# Patient Record
Sex: MALE | Race: WHITE | NOT HISPANIC OR LATINO | Employment: FULL TIME | URBAN - METROPOLITAN AREA
[De-identification: names, ages, dates, MRNs, and addresses within clinical notes are randomized per-mention and may not be internally consistent; named-entity substitution may affect disease eponyms.]

---

## 2023-11-30 ENCOUNTER — HOSPITAL ENCOUNTER (OUTPATIENT)
Dept: CARDIOLOGY | Facility: CLINIC | Age: 78
Discharge: HOME | End: 2023-11-30
Payer: MEDICARE

## 2023-11-30 DIAGNOSIS — I34.0 MITRAL VALVE INSUFFICIENCY, UNSPECIFIED ETIOLOGY: ICD-10-CM

## 2023-11-30 LAB
AORTIC VALVE PEAK VELOCITY: 1.53
AV PEAK GRADIENT: 9.4
AVA (PEAK VEL): 3.61
EJECTION FRACTION APICAL 4 CHAMBER: 50.7
EJECTION FRACTION: 52
LEFT ATRIUM VOLUME AREA LENGTH INDEX BSA: 47.2
LEFT VENTRICLE INTERNAL DIMENSION DIASTOLE: 4.64 (ref 3.5–6)
LEFT VENTRICULAR OUTFLOW TRACT DIAMETER: 2.25
MITRAL VALVE E/A RATIO: 0.85
MITRAL VALVE E/E' RATIO: 7.7
RIGHT VENTRICLE FREE WALL PEAK S': 12
RIGHT VENTRICLE PEAK SYSTOLIC PRESSURE: 41.7
TRICUSPID ANNULAR PLANE SYSTOLIC EXCURSION: 2.5

## 2023-11-30 PROCEDURE — 93306 TTE W/DOPPLER COMPLETE: CPT | Performed by: INTERNAL MEDICINE

## 2023-11-30 PROCEDURE — 93306 TTE W/DOPPLER COMPLETE: CPT

## 2024-03-27 PROBLEM — I25.10 CAD (CORONARY ARTERY DISEASE): Chronic | Status: ACTIVE | Noted: 2022-03-08

## 2024-03-27 PROBLEM — E78.5 HYPERLIPIDEMIA: Status: ACTIVE | Noted: 2024-03-27

## 2024-03-27 PROBLEM — I34.0 MITRAL REGURGITATION: Status: ACTIVE | Noted: 2022-03-08

## 2024-03-27 PROBLEM — I25.10 ARTERIOSCLEROSIS OF CORONARY ARTERY: Status: ACTIVE | Noted: 2022-03-08

## 2024-03-27 PROBLEM — F51.01 PRIMARY INSOMNIA: Status: ACTIVE | Noted: 2019-08-12

## 2024-03-27 PROBLEM — I65.23 BILATERAL CAROTID ARTERY STENOSIS: Status: ACTIVE | Noted: 2022-03-08

## 2024-03-27 PROBLEM — K65.4 SCLEROSING MESENTERITIS (MULTI): Chronic | Status: ACTIVE | Noted: 2024-03-27

## 2024-03-27 PROBLEM — E78.5 HYPERLIPIDEMIA: Chronic | Status: ACTIVE | Noted: 2024-03-27

## 2024-03-27 PROBLEM — I34.0 MITRAL REGURGITATION: Chronic | Status: ACTIVE | Noted: 2022-03-08

## 2024-04-03 ENCOUNTER — OFFICE VISIT (OUTPATIENT)
Dept: CARDIOLOGY | Facility: CLINIC | Age: 79
End: 2024-04-03
Payer: MEDICARE

## 2024-04-03 VITALS
OXYGEN SATURATION: 95 % | SYSTOLIC BLOOD PRESSURE: 112 MMHG | WEIGHT: 173 LBS | DIASTOLIC BLOOD PRESSURE: 78 MMHG | HEART RATE: 48 BPM | BODY MASS INDEX: 27.92 KG/M2

## 2024-04-03 DIAGNOSIS — E78.2 MIXED HYPERLIPIDEMIA: Chronic | ICD-10-CM

## 2024-04-03 DIAGNOSIS — I10 BENIGN ESSENTIAL HTN: Primary | Chronic | ICD-10-CM

## 2024-04-03 DIAGNOSIS — I34.0 NONRHEUMATIC MITRAL VALVE REGURGITATION: Chronic | ICD-10-CM

## 2024-04-03 DIAGNOSIS — I25.10 CORONARY ARTERY DISEASE INVOLVING NATIVE CORONARY ARTERY OF NATIVE HEART WITHOUT ANGINA PECTORIS: Chronic | ICD-10-CM

## 2024-04-03 PROCEDURE — 3078F DIAST BP <80 MM HG: CPT | Performed by: INTERNAL MEDICINE

## 2024-04-03 PROCEDURE — 93000 ELECTROCARDIOGRAM COMPLETE: CPT | Performed by: INTERNAL MEDICINE

## 2024-04-03 PROCEDURE — 1159F MED LIST DOCD IN RCRD: CPT | Performed by: INTERNAL MEDICINE

## 2024-04-03 PROCEDURE — 3074F SYST BP LT 130 MM HG: CPT | Performed by: INTERNAL MEDICINE

## 2024-04-03 PROCEDURE — 1160F RVW MEDS BY RX/DR IN RCRD: CPT | Performed by: INTERNAL MEDICINE

## 2024-04-03 PROCEDURE — 99024 POSTOP FOLLOW-UP VISIT: CPT | Performed by: INTERNAL MEDICINE

## 2024-04-03 RX ORDER — PANTOPRAZOLE SODIUM 40 MG/1
40 TABLET, DELAYED RELEASE ORAL DAILY
COMMUNITY

## 2024-04-03 RX ORDER — ACETAMINOPHEN 500 MG
TABLET ORAL
COMMUNITY

## 2024-04-03 RX ORDER — RAMELTEON 8 MG/1
8 TABLET ORAL AS NEEDED
COMMUNITY

## 2024-04-03 RX ORDER — ASPIRIN 81 MG/1
1 TABLET ORAL DAILY
COMMUNITY

## 2024-04-03 RX ORDER — TAMSULOSIN HYDROCHLORIDE 0.4 MG/1
1 CAPSULE ORAL NIGHTLY
COMMUNITY
Start: 2023-03-06

## 2024-04-03 RX ORDER — FLUTICASONE PROPIONATE 50 MCG
2 SPRAY, SUSPENSION (ML) NASAL DAILY
COMMUNITY

## 2024-04-03 RX ORDER — LORAZEPAM 0.5 MG/1
TABLET ORAL
COMMUNITY
End: 2024-04-03 | Stop reason: SDUPTHER

## 2024-04-03 RX ORDER — LORAZEPAM 0.5 MG/1
0.5 TABLET ORAL
COMMUNITY
Start: 2024-03-11 | End: 2024-07-09

## 2024-04-03 RX ORDER — LOSARTAN POTASSIUM 50 MG/1
50 TABLET ORAL
COMMUNITY
Start: 2024-01-12 | End: 2024-04-11

## 2024-04-03 NOTE — PROGRESS NOTES
Referred by No ref. provider found    HPI I am seeing Isiah for yearly follow-up.  Overall feeling well.  Still exercising vigorously at [employer].  No chest pain or pressure no shortness of breath.  Sometimes feels as though he is lacking endurance.  Sometimes has backache and ankle pain.  No palpitations.    Past Medical History:  Problem List Items Addressed This Visit    None       Past Medical History:   Diagnosis Date    CAD (coronary artery disease) 03/08/2022     Ag units    Hyperlipidemia 03/27/2024    Dr. White follows    Mitral regurgitation 03/08/2022    mild    Sclerosing mesenteritis (CMS/HCC) 03/27/2024    Treated at [facility]             Past Surgical History:  He has a past surgical history that includes Other surgical history (02/01/2019).      Social History:  He has no history on file for tobacco use, alcohol use, and drug use.    Family History:  No family history on file.     Allergies:  Patient has no known allergies.    Outpatient Medications:  Current Outpatient Medications   Medication Instructions    atorvastatin (LIPITOR) 20 mg, oral, Daily        Last Recorded Vitals:  There were no vitals filed for this visit.    Physical Exam    Physical  Patient is alert and oriented x3.  HEENT is unremarkable mucous members are moist  Neck no JVP no bruits upstrokes are full no thyromegaly  Lungs are clear bilaterally.  No wheezing crackles or rales  Heart regular rhythm normal S1-S2 there is no S3 no murmurs are heard.  Abdomen is soft vessels are positive nontender nondistended no organomegaly no pulsatile masses  Extremities have no edema.  Distal pulses present palpable.  Neuro is grossly nonfocal  Skin has no rashes     Last Labs:  CBC -  Lab Results   Component Value Date    WBC 4.9 05/19/2019    HGB 11.9 (L) 05/19/2019    HCT 36.4 (L) 05/19/2019    MCV 98 05/19/2019     05/19/2019       CMP -  Lab Results   Component Value Date    CALCIUM 9.8 09/30/2020    PHOS 3.2  05/19/2019    PROT 7.1 09/30/2020    ALBUMIN 4.3 09/30/2020    AST 23 09/30/2020    ALT 26 09/30/2020    ALKPHOS 49 09/30/2020    BILITOT 0.5 09/30/2020       LIPID PANEL -   Lab Results   Component Value Date    CHOL 161 09/30/2020    HDL 90.7 09/30/2020    CHHDL 1.8 09/30/2020    VLDL 12 09/30/2020    TRIG 59 09/30/2020       RENAL FUNCTION PANEL -   Lab Results   Component Value Date    K 4.4 09/30/2020    PHOS 3.2 05/19/2019       Lab Results   Component Value Date    HGBA1C 5.6 03/28/2023     Procedure    Echo 11/30/2023 EF 55 to 60%, DDF, mild to moderate mitral regurgitation mild pulmonary hypertension    EX NST [09/30/2021]: 13 min 1 sec (15.30 METs) . . . Normal â€“ 62%     ECHO [09/30/2021]: EF 60-65%. SD = impaired relaxation pattern. Mild to moderate MR. RVSP sly elev at 31.3 mmHg.     ECHO (02/14/2019) Normal LVF 55-60% EF. Mod MVR     CAC [12/2017] = 646 Agatston units (593 in LAD)     STRESS ECHO [12/19/2017]: EF 56%, normal RV, 1+ MR, normal post stress echo imaging          Assessment/Plan   1. CAD. Elevated calcium score 646 units. No symptoms of angina. He is on aspirin, atorvastatin, and is exercising regularly. His last stress test 2021 revealed excellent functional capacity where he exercised for 13 minutes. Normal perfusion.  No symptoms.  EKG today.     2. Hyperlipidemia. He is on atorvastatin 20. Blood work is followed by his primary care physician.  3/28/2023 LDL 63 HDL 96 triglycerides 62.  This will be rechecked by his primary care doctor in the near future.  Target LDL less than 70     3. Mitral regurgitation.  11/20/2023 EF 55 to 60% mild to moderate mitral regurgitation mild pulmonary hypertension this can be rechecked November 2025.    EKG today.  Return 1 year.    Carrol Ocampo MD     Instructions and follow up

## 2024-11-14 ENCOUNTER — HOSPITAL ENCOUNTER (OUTPATIENT)
Dept: RADIOLOGY | Facility: CLINIC | Age: 79
Discharge: HOME | End: 2024-11-14
Payer: MEDICARE

## 2024-11-14 ENCOUNTER — HOSPITAL ENCOUNTER (OUTPATIENT)
Dept: CARDIOLOGY | Facility: CLINIC | Age: 79
Discharge: HOME | End: 2024-11-14
Payer: MEDICARE

## 2024-11-14 DIAGNOSIS — I25.10 CORONARY ARTERY DISEASE INVOLVING NATIVE CORONARY ARTERY OF NATIVE HEART WITHOUT ANGINA PECTORIS: Primary | Chronic | ICD-10-CM

## 2024-11-14 DIAGNOSIS — I25.10 CAD (CORONARY ARTERY DISEASE): Primary | Chronic | ICD-10-CM

## 2024-11-14 DIAGNOSIS — I34.0 NONRHEUMATIC MITRAL VALVE REGURGITATION: Chronic | ICD-10-CM

## 2024-11-14 DIAGNOSIS — R07.9 CHEST PAIN, UNSPECIFIED: ICD-10-CM

## 2024-11-14 DIAGNOSIS — I25.10 CORONARY ARTERY DISEASE INVOLVING NATIVE CORONARY ARTERY OF NATIVE HEART WITHOUT ANGINA PECTORIS: Chronic | ICD-10-CM

## 2024-11-14 DIAGNOSIS — K65.4 SCLEROSING MESENTERITIS (MULTI): Chronic | ICD-10-CM

## 2024-11-14 PROCEDURE — 78452 HT MUSCLE IMAGE SPECT MULT: CPT

## 2024-11-14 PROCEDURE — 3430000001 HC RX 343 DIAGNOSTIC RADIOPHARMACEUTICALS: Performed by: INTERNAL MEDICINE

## 2024-11-14 PROCEDURE — 93017 CV STRESS TEST TRACING ONLY: CPT

## 2024-11-14 PROCEDURE — A9500 TC99M SESTAMIBI: HCPCS | Performed by: INTERNAL MEDICINE

## 2024-11-14 PROCEDURE — 93018 CV STRESS TEST I&R ONLY: CPT | Performed by: INTERNAL MEDICINE

## 2024-11-14 PROCEDURE — 93016 CV STRESS TEST SUPVJ ONLY: CPT | Performed by: INTERNAL MEDICINE

## 2024-11-14 RX ORDER — TETRAKIS(2-METHOXYISOBUTYLISOCYANIDE)COPPER(I) TETRAFLUOROBORATE 1 MG/ML
35.5 INJECTION, POWDER, LYOPHILIZED, FOR SOLUTION INTRAVENOUS
Status: COMPLETED | OUTPATIENT
Start: 2024-11-14 | End: 2024-11-14

## 2024-11-14 RX ORDER — TETRAKIS(2-METHOXYISOBUTYLISOCYANIDE)COPPER(I) TETRAFLUOROBORATE 1 MG/ML
11.5 INJECTION, POWDER, LYOPHILIZED, FOR SOLUTION INTRAVENOUS
Status: COMPLETED | OUTPATIENT
Start: 2024-11-14 | End: 2024-11-14

## 2024-11-19 ENCOUNTER — APPOINTMENT (OUTPATIENT)
Dept: RADIOLOGY | Facility: CLINIC | Age: 79
End: 2024-11-19
Payer: MEDICARE

## 2024-11-19 ENCOUNTER — APPOINTMENT (OUTPATIENT)
Dept: CARDIOLOGY | Facility: CLINIC | Age: 79
End: 2024-11-19
Payer: MEDICARE

## 2025-04-29 PROBLEM — M54.50 BACK PAIN, LUMBOSACRAL: Status: ACTIVE | Noted: 2024-09-26

## 2025-05-14 ENCOUNTER — APPOINTMENT (OUTPATIENT)
Dept: CARDIOLOGY | Facility: CLINIC | Age: 80
End: 2025-05-14
Payer: MEDICARE

## 2025-05-14 VITALS
OXYGEN SATURATION: 95 % | WEIGHT: 170 LBS | BODY MASS INDEX: 26.63 KG/M2 | SYSTOLIC BLOOD PRESSURE: 114 MMHG | DIASTOLIC BLOOD PRESSURE: 60 MMHG | HEART RATE: 60 BPM

## 2025-05-14 DIAGNOSIS — I34.0 NONRHEUMATIC MITRAL VALVE REGURGITATION: Chronic | ICD-10-CM

## 2025-05-14 DIAGNOSIS — I25.10 CORONARY ARTERY DISEASE INVOLVING NATIVE CORONARY ARTERY OF NATIVE HEART WITHOUT ANGINA PECTORIS: Chronic | ICD-10-CM

## 2025-05-14 DIAGNOSIS — I10 BENIGN ESSENTIAL HYPERTENSION: Primary | ICD-10-CM

## 2025-05-14 DIAGNOSIS — I34.0 NONRHEUMATIC MITRAL (VALVE) INSUFFICIENCY: ICD-10-CM

## 2025-05-14 DIAGNOSIS — E78.2 MIXED HYPERLIPIDEMIA: Chronic | ICD-10-CM

## 2025-05-14 PROBLEM — M54.50 BACK PAIN, LUMBOSACRAL: Status: RESOLVED | Noted: 2024-09-26 | Resolved: 2025-05-14

## 2025-05-14 PROCEDURE — 93000 ELECTROCARDIOGRAM COMPLETE: CPT | Performed by: INTERNAL MEDICINE

## 2025-05-14 PROCEDURE — 99213 OFFICE O/P EST LOW 20 MIN: CPT | Performed by: INTERNAL MEDICINE

## 2025-05-14 PROCEDURE — 3074F SYST BP LT 130 MM HG: CPT | Performed by: INTERNAL MEDICINE

## 2025-05-14 PROCEDURE — 3078F DIAST BP <80 MM HG: CPT | Performed by: INTERNAL MEDICINE

## 2025-05-14 PROCEDURE — 1159F MED LIST DOCD IN RCRD: CPT | Performed by: INTERNAL MEDICINE

## 2025-05-14 PROCEDURE — 1160F RVW MEDS BY RX/DR IN RCRD: CPT | Performed by: INTERNAL MEDICINE

## 2025-05-14 RX ORDER — ATORVASTATIN CALCIUM 40 MG/1
40 TABLET, FILM COATED ORAL DAILY
Qty: 90 TABLET | Refills: 3 | Status: SHIPPED | OUTPATIENT
Start: 2025-05-14 | End: 2026-05-14

## 2025-05-14 NOTE — PATIENT INSTRUCTIONS
"1. CAD. Elevated calcium score 646 units. No symptoms of angina. He is on aspirin, atorvastatin, and is exercising regularly. TST 11/14/2024 negative EKG excellent capacity,12'20\" 14 METS, no symptoms no EKG changes normal perfusion EF 56%     2. Hyperlipidemia. He is on atorvastatin 20. Blood work is followed by his primary care physician.  3/28/2023 LDL 63 HDL 96 triglycerides 62. 4/11/2025 HDL 89, Trig 63, T chol 185. I'll increase his atorva to 40 mg. FBW 3 months.     3. Mitral regurgitation.  11/20/2023 EF 55 to 60% mild to moderate mitral regurgitation mild pulmonary hypertension this can be rechecked November 2025.    EKG today.  Return 1 year. Echo in 11/2025. FBW 8/2025.  "

## 2025-05-14 NOTE — PROGRESS NOTES
Referred by No ref. provider found    HPI I am seeing Isiah for yearly follow-up.  He continues to be very active.  He is to 1250 tic sessions.  No chest pain or pressure.    Past Medical History:  Problem List Items Addressed This Visit    None     Past Medical History:   Diagnosis Date    Benign essential HTN 02/01/2005    CAD (coronary artery disease) 03/08/2022     Ag units    Hyperlipidemia 03/27/2024    Dr. White follows    Mitral regurgitation 03/08/2022    mild    Sclerosing mesenteritis (Multi) 03/27/2024    Treated at [facility]      Past Surgical History:  He has a past surgical history that includes Other surgical history (02/01/2019).      Social History:  He reports that he has been smoking cigars. He has never used smokeless tobacco. No history on file for alcohol use and drug use.    Family History:  No family history on file.     Allergies:  Patient has no known allergies.    Outpatient Medications:  Current Outpatient Medications   Medication Instructions    aspirin 81 mg EC tablet 1 tablet, oral, Daily    atorvastatin (LIPITOR) 20 mg, oral, Daily    cholecalciferol (Vitamin D-3) 5,000 Units tablet oral    fluticasone (Flonase) 50 mcg/actuation nasal spray 2 sprays, Each Nostril, Daily    LORazepam (ATIVAN) 0.5 mg, oral    losartan (COZAAR) 50 mg, oral, Daily RT    pantoprazole (PROTONIX) 40 mg, oral, Daily    ramelteon (ROZEREM) 8 mg, oral, As needed    tamsulosin (Flomax) 0.4 mg 24 hr capsule 1 capsule, oral, Nightly     Last Recorded Vitals:  There were no vitals filed for this visit.    Physical Exam  Patient is alert and oriented x3.  HEENT is unremarkable mucous members are moist  Neck no JVP no bruits upstrokes are full no thyromegaly  Lungs are clear bilaterally.  No wheezing crackles or rales  Heart regular rhythm normal S1-S2 there is no S3 no murmurs are heard.  Abdomen is soft bs are positive nontender nondistended no organomegaly no pulsatile masses  Extremities have  "no edema.  Distal pulses present palpable.  Neuro is grossly nonfocal  Skin has no rashes     Last Labs:  CBC -  Lab Results   Component Value Date    WBC 4.9 05/19/2019    HGB 11.9 (L) 05/19/2019    HCT 36.4 (L) 05/19/2019    MCV 98 05/19/2019     05/19/2019     CMP -  Lab Results   Component Value Date    CALCIUM 9.8 09/30/2020    PHOS 3.2 05/19/2019    PROT 7.1 09/30/2020    ALBUMIN 4.3 09/30/2020    AST 23 09/30/2020    ALT 26 09/30/2020    ALKPHOS 49 09/30/2020    BILITOT 0.5 09/30/2020     LIPID PAnEL -   Lab Results   Component Value Date    CHOL 161 09/30/2020    HDL 90.7 09/30/2020    CHHDL 1.8 09/30/2020    VLDL 12 09/30/2020    TRIG 59 09/30/2020     RENAL FUNCTION PANEL -   Lab Results   Component Value Date    K 4.4 09/30/2020    PHOS 3.2 05/19/2019     Lab Results   Component Value Date    HGBA1C 5.5 04/09/2024     Procedure    TST 11/14/2024 negative EKG excellent capacity,12'20\" 14 METS, no symptoms no EKG changes normal perfusion EF 56%    Echo 11/30/2023 EF 55 to 60%, DDF, mild to moderate mitral regurgitation mild pulmonary hypertension    Carotid duplex 4/12/2022 [facility] mild bilaterally    EX NST [09/30/2021]: 13 min 1 sec (15.30 METs) . . . Normal â€“ 62%     ECHO [09/30/2021]: EF 60-65%. SD = impaired relaxation pattern. Mild to moderate MR. RVSP sly elev at 31.3 mmHg.     ECHO (02/14/2019) Normal LVF 55-60% EF. Mod MVR     CAC [12/2017] = 646 Agatston units (593 in LAD)     STRESS ECHO [12/19/2017]: EF 56%, normal RV, 1+ MR, normal post stress echo imaging        Assessment/Plan   1. CAD. Elevated calcium score 646 units. No symptoms of angina. He is on aspirin, atorvastatin, and is exercising regularly. TST 11/14/2024 negative EKG excellent capacity,12'20\" 14 METS, no symptoms no EKG changes normal perfusion EF 56%     2. Hyperlipidemia. He is on atorvastatin 20. Blood work is followed by his primary care physician.  3/28/2023 LDL 63 HDL 96 triglycerides 62. 4/11/2025 HDL 89, Trig 63, T " chol 185. I'll increase his atorva to 40 mg. FBW 3 months.     3. Mitral regurgitation.  11/20/2023 EF 55 to 60% mild to moderate mitral regurgitation mild pulmonary hypertension this can be rechecked November 2025.    EKG today.  Return 1 year. Echo in 11/2025. FBW 8/2025.    Carrol Ocampo MD     Instructions and follow up

## 2025-08-14 DIAGNOSIS — I10 BENIGN ESSENTIAL HYPERTENSION: ICD-10-CM

## 2025-08-14 DIAGNOSIS — I34.0 NONRHEUMATIC MITRAL VALVE REGURGITATION: Chronic | ICD-10-CM

## 2025-08-14 DIAGNOSIS — I25.10 CORONARY ARTERY DISEASE INVOLVING NATIVE CORONARY ARTERY OF NATIVE HEART WITHOUT ANGINA PECTORIS: Chronic | ICD-10-CM

## 2026-05-20 ENCOUNTER — APPOINTMENT (OUTPATIENT)
Dept: CARDIOLOGY | Facility: CLINIC | Age: 81
End: 2026-05-20
Payer: MEDICARE